# Patient Record
Sex: FEMALE | URBAN - METROPOLITAN AREA
[De-identification: names, ages, dates, MRNs, and addresses within clinical notes are randomized per-mention and may not be internally consistent; named-entity substitution may affect disease eponyms.]

---

## 2022-11-30 ENCOUNTER — APPOINTMENT (RX ONLY)
Dept: URBAN - METROPOLITAN AREA CLINIC 151 | Facility: CLINIC | Age: 37
Setting detail: DERMATOLOGY
End: 2022-11-30

## 2022-11-30 DIAGNOSIS — L60.1 ONYCHOLYSIS: ICD-10-CM

## 2022-11-30 DIAGNOSIS — L20.89 OTHER ATOPIC DERMATITIS: ICD-10-CM

## 2022-11-30 DIAGNOSIS — D22 MELANOCYTIC NEVI: ICD-10-CM

## 2022-11-30 PROBLEM — L20.84 INTRINSIC (ALLERGIC) ECZEMA: Status: ACTIVE | Noted: 2022-11-30

## 2022-11-30 PROBLEM — D22.61 MELANOCYTIC NEVI OF RIGHT UPPER LIMB, INCLUDING SHOULDER: Status: ACTIVE | Noted: 2022-11-30

## 2022-11-30 PROCEDURE — ? PRESCRIPTION

## 2022-11-30 PROCEDURE — ? DIAGNOSIS COMMENT

## 2022-11-30 PROCEDURE — ? COUNSELING

## 2022-11-30 PROCEDURE — ? PRESCRIPTION MEDICATION MANAGEMENT

## 2022-11-30 PROCEDURE — 99203 OFFICE O/P NEW LOW 30 MIN: CPT

## 2022-11-30 RX ORDER — TACROLIMUS 1 MG/G
OINTMENT TOPICAL
Qty: 60 | Refills: 3 | Status: ERX | COMMUNITY
Start: 2022-11-30

## 2022-11-30 RX ADMIN — TACROLIMUS: 1 OINTMENT TOPICAL at 00:00

## 2022-11-30 ASSESSMENT — LOCATION DETAILED DESCRIPTION DERM
LOCATION DETAILED: RIGHT CENTRAL EYEBROW
LOCATION DETAILED: LEFT CENTRAL EYEBROW
LOCATION DETAILED: RIGHT AREOLA
LOCATION DETAILED: RIGHT RADIAL DORSAL HAND
LOCATION DETAILED: LEFT AREOLA

## 2022-11-30 ASSESSMENT — LOCATION SIMPLE DESCRIPTION DERM
LOCATION SIMPLE: RIGHT EYEBROW
LOCATION SIMPLE: LEFT BREAST
LOCATION SIMPLE: RIGHT BREAST
LOCATION SIMPLE: RIGHT HAND
LOCATION SIMPLE: LEFT EYEBROW

## 2022-11-30 ASSESSMENT — LOCATION ZONE DERM
LOCATION ZONE: TRUNK
LOCATION ZONE: FACE
LOCATION ZONE: HAND

## 2022-11-30 NOTE — HPI: ECZEMA (PATIENT REPORTED)
Where Is Your Eczema Located?: Eyelids and areolae\\n\\n
Additional Comments (Use Complete Sentences): Patient had eczema of the eyelids as a child and it has now come back. Pt also reports of having eczema on the elbows, areolae and in the genital area.

## 2022-11-30 NOTE — PROCEDURE: PRESCRIPTION MEDICATION MANAGEMENT
Initiate Treatment: tacrolimus 0.1 % topical ointment BID
Detail Level: Zone
Render In Strict Bullet Format?: No

## 2022-11-30 NOTE — PROCEDURE: DIAGNOSIS COMMENT
Render Risk Assessment In Note?: no
Comment: Mild. Ok to continue OTC nail strengthener polishes.\\n\\nRecommends biotin 5000mcg QD but stop taking biotin 7 days before getting any blood work done.
Detail Level: Zone
Comment: Mild. Pt has a h/o childhood eczema.

## 2023-05-24 ENCOUNTER — APPOINTMENT (RX ONLY)
Dept: URBAN - METROPOLITAN AREA CLINIC 151 | Facility: CLINIC | Age: 38
Setting detail: DERMATOLOGY
End: 2023-05-24

## 2023-05-24 DIAGNOSIS — L20.89 OTHER ATOPIC DERMATITIS: ICD-10-CM

## 2023-05-24 DIAGNOSIS — L70.0 ACNE VULGARIS: ICD-10-CM

## 2023-05-24 PROBLEM — L20.84 INTRINSIC (ALLERGIC) ECZEMA: Status: ACTIVE | Noted: 2023-05-24

## 2023-05-24 PROCEDURE — ? PRESCRIPTION

## 2023-05-24 PROCEDURE — 99213 OFFICE O/P EST LOW 20 MIN: CPT

## 2023-05-24 PROCEDURE — ? PRESCRIPTION MEDICATION MANAGEMENT

## 2023-05-24 PROCEDURE — ? COUNSELING

## 2023-05-24 PROCEDURE — ? DIAGNOSIS COMMENT

## 2023-05-24 RX ORDER — CRISABOROLE 20 MG/G
OINTMENT TOPICAL
Qty: 60 | Refills: 3 | Status: ERX | COMMUNITY
Start: 2023-05-24

## 2023-05-24 RX ORDER — SPIRONOLACTONE 50 MG/1
TABLET, FILM COATED ORAL
Qty: 90 | Refills: 3 | Status: ERX | COMMUNITY
Start: 2023-05-24

## 2023-05-24 RX ADMIN — CRISABOROLE: 20 OINTMENT TOPICAL at 00:00

## 2023-05-24 RX ADMIN — SPIRONOLACTONE: 50 TABLET, FILM COATED ORAL at 00:00

## 2023-05-24 ASSESSMENT — LOCATION SIMPLE DESCRIPTION DERM
LOCATION SIMPLE: LEFT CHEEK
LOCATION SIMPLE: LEFT INFERIOR EYELID
LOCATION SIMPLE: RIGHT INFERIOR EYELID

## 2023-05-24 ASSESSMENT — LOCATION DETAILED DESCRIPTION DERM
LOCATION DETAILED: LEFT LATERAL INFERIOR PRESEPTAL REGION
LOCATION DETAILED: LEFT INFERIOR CENTRAL BUCCAL CHEEK
LOCATION DETAILED: RIGHT MEDIAL INFERIOR EYELID

## 2023-05-24 ASSESSMENT — LOCATION ZONE DERM
LOCATION ZONE: FACE
LOCATION ZONE: EYELID

## 2023-05-24 NOTE — HPI: PIMPLES (ACNE)
Is This A New Presentation, Or A Follow-Up?: Acne
Additional Comments (Use Complete Sentences): Pt has an IUD in place. She used to be on ROEL for her acne. She says her acne flared post pregnancy in 2015.

## 2023-05-24 NOTE — PROCEDURE: DIAGNOSIS COMMENT
Comment: Mild presentation today. Discussed switching topical therapy to Eucrisa BID given the burning with tacrolimus. Provided samples today. Reassured Eucrisa is safe to apply to the eyelids. Also recommended refrigerating Eucrisa. Advised pt to reduce applications to 2-3 times weekly for maintenance once clear. Referred pt to Dr. Hernandez for patch testing given that there may be ACD from glasses frame materials and recommended [employer] nnamdi for allergens post testing.
Detail Level: Zone
Render Risk Assessment In Note?: no
Comment: Hormonal distribution. Explained cyclical nature of flares in relation to menses. Discussed IUD can contribute to acne due to progesterone. Pt has a history of ROEL use which has a spironolactone equivalent in it. Discussed Spironolactone mechanism of action, dosing, and side effects. Will start pt at 50mg QD. Recommended staying well hydrated while taking medication. Provided samples of LRP Effaclar Adapalene 0.1% gel to start. Pt has a history of Tretinoin use, may prescribe in the future if Adapalene is well tolerated.

## 2023-05-24 NOTE — PROCEDURE: PRESCRIPTION MEDICATION MANAGEMENT
Initiate Treatment: Eucrisa BID
Detail Level: Zone
Render In Strict Bullet Format?: No
Initiate Treatment: Spironolactone 50mg QD